# Patient Record
Sex: FEMALE | Race: WHITE | HISPANIC OR LATINO | URBAN - METROPOLITAN AREA
[De-identification: names, ages, dates, MRNs, and addresses within clinical notes are randomized per-mention and may not be internally consistent; named-entity substitution may affect disease eponyms.]

---

## 2017-01-20 ENCOUNTER — IMPORTED ENCOUNTER (OUTPATIENT)
Dept: URBAN - METROPOLITAN AREA CLINIC 43 | Facility: CLINIC | Age: 62
End: 2017-01-20

## 2017-08-21 ENCOUNTER — IMPORTED ENCOUNTER (OUTPATIENT)
Dept: URBAN - METROPOLITAN AREA CLINIC 43 | Facility: CLINIC | Age: 62
End: 2017-08-21

## 2017-08-21 PROBLEM — H25.13: Noted: 2017-08-21

## 2017-08-21 PROBLEM — H16.223: Noted: 2017-08-21

## 2017-08-21 PROBLEM — H35.363: Noted: 2017-08-21

## 2017-08-21 PROBLEM — H35.3131: Noted: 2017-08-21

## 2020-04-18 ASSESSMENT — VISUAL ACUITY
OS_CC: J3
OS_SC: 2'/100
OS_SC: 20/400
OS_CC: 20/50+
OD_CC: J2
OD_PH: 20/40-2
OD_SC: 3'/100
OS_OTHER: 20/50.
OD_CC: 20/80
OS_PH: 20/40 -2
OD_SC: 20/400
OD_OTHER: 20/50.

## 2020-04-18 ASSESSMENT — KERATOMETRY
OS_AXISANGLE_DEGREES: 30
OS_AXISANGLE2_DEGREES: 120
OD_K2POWER_DIOPTERS: 46.75
OD_AXISANGLE_DEGREES: 170
OD_AXISANGLE2_DEGREES: 80
OS_K2POWER_DIOPTERS: 46.25
OS_K1POWER_DIOPTERS: 47.75
OD_K1POWER_DIOPTERS: 48.5

## 2020-04-18 ASSESSMENT — TONOMETRY
OD_IOP_MMHG: 10.0
OS_IOP_MMHG: 10.0

## 2022-02-14 ENCOUNTER — APPOINTMENT (RX ONLY)
Dept: URBAN - METROPOLITAN AREA CLINIC 124 | Facility: CLINIC | Age: 67
Setting detail: DERMATOLOGY
End: 2022-02-14

## 2022-02-14 DIAGNOSIS — L81.4 OTHER MELANIN HYPERPIGMENTATION: ICD-10-CM

## 2022-02-14 DIAGNOSIS — Z71.89 OTHER SPECIFIED COUNSELING: ICD-10-CM

## 2022-02-14 DIAGNOSIS — D18.0 HEMANGIOMA: ICD-10-CM

## 2022-02-14 DIAGNOSIS — L82.1 OTHER SEBORRHEIC KERATOSIS: ICD-10-CM

## 2022-02-14 DIAGNOSIS — D22 MELANOCYTIC NEVI: ICD-10-CM

## 2022-02-14 DIAGNOSIS — L82.0 INFLAMED SEBORRHEIC KERATOSIS: ICD-10-CM

## 2022-02-14 PROBLEM — D22.5 MELANOCYTIC NEVI OF TRUNK: Status: ACTIVE | Noted: 2022-02-14

## 2022-02-14 PROBLEM — D18.01 HEMANGIOMA OF SKIN AND SUBCUTANEOUS TISSUE: Status: ACTIVE | Noted: 2022-02-14

## 2022-02-14 PROCEDURE — 99203 OFFICE O/P NEW LOW 30 MIN: CPT | Mod: 25

## 2022-02-14 PROCEDURE — ? COUNSELING

## 2022-02-14 PROCEDURE — ? LIQUID NITROGEN

## 2022-02-14 PROCEDURE — 17110 DESTRUCTION B9 LES UP TO 14: CPT

## 2022-02-14 ASSESSMENT — LOCATION DETAILED DESCRIPTION DERM
LOCATION DETAILED: INFERIOR THORACIC SPINE
LOCATION DETAILED: SUPERIOR THORACIC SPINE
LOCATION DETAILED: EPIGASTRIC SKIN
LOCATION DETAILED: LEFT ANTERIOR DISTAL UPPER ARM
LOCATION DETAILED: MIDDLE STERNUM

## 2022-02-14 ASSESSMENT — LOCATION SIMPLE DESCRIPTION DERM
LOCATION SIMPLE: ABDOMEN
LOCATION SIMPLE: UPPER BACK
LOCATION SIMPLE: CHEST
LOCATION SIMPLE: LEFT UPPER ARM

## 2022-02-14 ASSESSMENT — LOCATION ZONE DERM
LOCATION ZONE: ARM
LOCATION ZONE: TRUNK

## 2022-02-14 NOTE — HPI: SKIN LESION
What Type Of Note Output Would You Prefer (Optional)?: Standard Output
How Severe Is Your Skin Lesion?: mild
Has Your Skin Lesion Been Treated?: not been treated
Is This A New Presentation, Or A Follow-Up?: Skin Lesion
Additional History: Patient is also here for a full body skin check

## 2022-02-14 NOTE — PROCEDURE: LIQUID NITROGEN
Number Of Freeze-Thaw Cycles: 3 freeze-thaw cycles
Render Post-Care Instructions In Note?: no
Show Aperture Variable?: Yes
Spray Paint Text: The liquid nitrogen was applied to the skin utilizing a spray paint frosting technique.
Consent: The patient's consent was obtained including but not limited to risks of crusting, scabbing, blistering, scarring, darker or lighter pigmentary change, recurrence, incomplete removal and infection.
Detail Level: Detailed
Medical Necessity Clause: This procedure was medically necessary because the lesions that were treated were:
Post-Care Instructions: I reviewed with the patient in detail post-care instructions. Patient is to wear sunprotection, and avoid picking at any of the treated lesions. Pt may apply Vaseline to crusted or scabbing areas.
Medical Necessity Information: It is in your best interest to select a reason for this procedure from the list below. All of these items fulfill various CMS LCD requirements except the new and changing color options.

## 2022-12-16 NOTE — PATIENT DISCUSSION
+0.50 near vision specs.
Discussed with pt to start with the RX for near and around the home and then to continue for dist if possible or then she may have relaxed accomm enough to go without in dist. F/U 6 months.
Glasses Prescription given to patient.
Patient advised to call if any problems, questions, or concerns.
No

## 2023-01-22 NOTE — PATIENT DISCUSSION
+0.50 near vision specs.
Glasses Prescription given to patient.
Patient advised to call if any problems, questions, or concerns.
DISPLAY PLAN FREE TEXT